# Patient Record
Sex: MALE | Race: WHITE | NOT HISPANIC OR LATINO
[De-identification: names, ages, dates, MRNs, and addresses within clinical notes are randomized per-mention and may not be internally consistent; named-entity substitution may affect disease eponyms.]

---

## 2017-09-27 ENCOUNTER — TRANSCRIPTION ENCOUNTER (OUTPATIENT)
Age: 52
End: 2017-09-27

## 2017-09-27 ENCOUNTER — OUTPATIENT (OUTPATIENT)
Dept: OUTPATIENT SERVICES | Facility: HOSPITAL | Age: 52
LOS: 1 days | Discharge: HOME | End: 2017-09-27
Payer: COMMERCIAL

## 2017-09-27 PROCEDURE — 45378 DIAGNOSTIC COLONOSCOPY: CPT

## 2020-05-12 ENCOUNTER — EMERGENCY (EMERGENCY)
Facility: HOSPITAL | Age: 55
LOS: 1 days | Discharge: HOME | End: 2020-05-12
Attending: EMERGENCY MEDICINE
Payer: COMMERCIAL

## 2020-05-12 VITALS
TEMPERATURE: 98 F | DIASTOLIC BLOOD PRESSURE: 100 MMHG | HEART RATE: 76 BPM | SYSTOLIC BLOOD PRESSURE: 145 MMHG | OXYGEN SATURATION: 99 % | WEIGHT: 195.11 LBS | RESPIRATION RATE: 18 BRPM | HEIGHT: 70 IN

## 2020-05-12 PROCEDURE — 99285 EMERGENCY DEPT VISIT HI MDM: CPT

## 2020-05-13 VITALS
HEART RATE: 59 BPM | OXYGEN SATURATION: 100 % | SYSTOLIC BLOOD PRESSURE: 125 MMHG | DIASTOLIC BLOOD PRESSURE: 82 MMHG | RESPIRATION RATE: 18 BRPM | TEMPERATURE: 99 F

## 2020-05-13 LAB
ALBUMIN SERPL ELPH-MCNC: 3.9 G/DL — SIGNIFICANT CHANGE UP (ref 3.3–5)
ALP SERPL-CCNC: 62 U/L — SIGNIFICANT CHANGE UP (ref 40–120)
ALT FLD-CCNC: 86 U/L — HIGH (ref 10–45)
ANION GAP SERPL CALC-SCNC: 16 MMOL/L — SIGNIFICANT CHANGE UP (ref 5–17)
APPEARANCE UR: CLEAR — SIGNIFICANT CHANGE UP
APTT BLD: 25.9 SEC — LOW (ref 27.5–36.3)
AST SERPL-CCNC: 66 U/L — HIGH (ref 10–40)
BACTERIA # UR AUTO: NEGATIVE — SIGNIFICANT CHANGE UP
BASOPHILS # BLD AUTO: 0.02 K/UL — SIGNIFICANT CHANGE UP (ref 0–0.2)
BASOPHILS NFR BLD AUTO: 0.2 % — SIGNIFICANT CHANGE UP (ref 0–2)
BILIRUB SERPL-MCNC: 0.8 MG/DL — SIGNIFICANT CHANGE UP (ref 0.2–1.2)
BILIRUB UR-MCNC: NEGATIVE — SIGNIFICANT CHANGE UP
BUN SERPL-MCNC: 22 MG/DL — SIGNIFICANT CHANGE UP (ref 7–23)
CALCIUM SERPL-MCNC: 9.2 MG/DL — SIGNIFICANT CHANGE UP (ref 8.4–10.5)
CHLORIDE SERPL-SCNC: 102 MMOL/L — SIGNIFICANT CHANGE UP (ref 96–108)
CO2 SERPL-SCNC: 21 MMOL/L — LOW (ref 22–31)
COLOR SPEC: YELLOW — SIGNIFICANT CHANGE UP
CREAT SERPL-MCNC: 1.33 MG/DL — HIGH (ref 0.5–1.3)
DIFF PNL FLD: NEGATIVE — SIGNIFICANT CHANGE UP
EOSINOPHIL # BLD AUTO: 0.09 K/UL — SIGNIFICANT CHANGE UP (ref 0–0.5)
EOSINOPHIL NFR BLD AUTO: 0.9 % — SIGNIFICANT CHANGE UP (ref 0–6)
EPI CELLS # UR: 1 /HPF — SIGNIFICANT CHANGE UP
GAS PNL BLDV: SIGNIFICANT CHANGE UP
GLUCOSE SERPL-MCNC: 108 MG/DL — HIGH (ref 70–99)
GLUCOSE UR QL: NEGATIVE — SIGNIFICANT CHANGE UP
HCT VFR BLD CALC: 39.3 % — SIGNIFICANT CHANGE UP (ref 39–50)
HGB BLD-MCNC: 13.2 G/DL — SIGNIFICANT CHANGE UP (ref 13–17)
HYALINE CASTS # UR AUTO: 1 /LPF — SIGNIFICANT CHANGE UP (ref 0–2)
IMM GRANULOCYTES NFR BLD AUTO: 0.3 % — SIGNIFICANT CHANGE UP (ref 0–1.5)
INR BLD: 0.87 RATIO — LOW (ref 0.88–1.16)
KETONES UR-MCNC: NEGATIVE — SIGNIFICANT CHANGE UP
LEUKOCYTE ESTERASE UR-ACNC: NEGATIVE — SIGNIFICANT CHANGE UP
LIDOCAIN IGE QN: 31 U/L — SIGNIFICANT CHANGE UP (ref 7–60)
LYMPHOCYTES # BLD AUTO: 1.6 K/UL — SIGNIFICANT CHANGE UP (ref 1–3.3)
LYMPHOCYTES # BLD AUTO: 16.6 % — SIGNIFICANT CHANGE UP (ref 13–44)
MCHC RBC-ENTMCNC: 29.2 PG — SIGNIFICANT CHANGE UP (ref 27–34)
MCHC RBC-ENTMCNC: 33.6 GM/DL — SIGNIFICANT CHANGE UP (ref 32–36)
MCV RBC AUTO: 86.9 FL — SIGNIFICANT CHANGE UP (ref 80–100)
MONOCYTES # BLD AUTO: 0.67 K/UL — SIGNIFICANT CHANGE UP (ref 0–0.9)
MONOCYTES NFR BLD AUTO: 7 % — SIGNIFICANT CHANGE UP (ref 2–14)
NEUTROPHILS # BLD AUTO: 7.22 K/UL — SIGNIFICANT CHANGE UP (ref 1.8–7.4)
NEUTROPHILS NFR BLD AUTO: 75 % — SIGNIFICANT CHANGE UP (ref 43–77)
NITRITE UR-MCNC: NEGATIVE — SIGNIFICANT CHANGE UP
NRBC # BLD: 0 /100 WBCS — SIGNIFICANT CHANGE UP (ref 0–0)
PH UR: 6 — SIGNIFICANT CHANGE UP (ref 5–8)
PLATELET # BLD AUTO: 189 K/UL — SIGNIFICANT CHANGE UP (ref 150–400)
POTASSIUM SERPL-MCNC: 3.8 MMOL/L — SIGNIFICANT CHANGE UP (ref 3.5–5.3)
POTASSIUM SERPL-SCNC: 3.8 MMOL/L — SIGNIFICANT CHANGE UP (ref 3.5–5.3)
PROT SERPL-MCNC: 7.1 G/DL — SIGNIFICANT CHANGE UP (ref 6–8.3)
PROT UR-MCNC: ABNORMAL
PROTHROM AB SERPL-ACNC: 10 SEC — SIGNIFICANT CHANGE UP (ref 10–12.9)
RBC # BLD: 4.52 M/UL — SIGNIFICANT CHANGE UP (ref 4.2–5.8)
RBC # FLD: 13.2 % — SIGNIFICANT CHANGE UP (ref 10.3–14.5)
RBC CASTS # UR COMP ASSIST: 1 /HPF — SIGNIFICANT CHANGE UP (ref 0–4)
SODIUM SERPL-SCNC: 139 MMOL/L — SIGNIFICANT CHANGE UP (ref 135–145)
SP GR SPEC: 1.02 — SIGNIFICANT CHANGE UP (ref 1.01–1.02)
UROBILINOGEN FLD QL: NEGATIVE — SIGNIFICANT CHANGE UP
WBC # BLD: 9.63 K/UL — SIGNIFICANT CHANGE UP (ref 3.8–10.5)
WBC # FLD AUTO: 9.63 K/UL — SIGNIFICANT CHANGE UP (ref 3.8–10.5)
WBC UR QL: 2 /HPF — SIGNIFICANT CHANGE UP (ref 0–5)

## 2020-05-13 PROCEDURE — 84132 ASSAY OF SERUM POTASSIUM: CPT

## 2020-05-13 PROCEDURE — 80053 COMPREHEN METABOLIC PANEL: CPT

## 2020-05-13 PROCEDURE — 85027 COMPLETE CBC AUTOMATED: CPT

## 2020-05-13 PROCEDURE — 85610 PROTHROMBIN TIME: CPT

## 2020-05-13 PROCEDURE — 81001 URINALYSIS AUTO W/SCOPE: CPT

## 2020-05-13 PROCEDURE — 74176 CT ABD & PELVIS W/O CONTRAST: CPT

## 2020-05-13 PROCEDURE — 93005 ELECTROCARDIOGRAM TRACING: CPT

## 2020-05-13 PROCEDURE — 85730 THROMBOPLASTIN TIME PARTIAL: CPT

## 2020-05-13 PROCEDURE — 76705 ECHO EXAM OF ABDOMEN: CPT | Mod: 26,RT

## 2020-05-13 PROCEDURE — 87086 URINE CULTURE/COLONY COUNT: CPT

## 2020-05-13 PROCEDURE — 99284 EMERGENCY DEPT VISIT MOD MDM: CPT | Mod: 25

## 2020-05-13 PROCEDURE — 82435 ASSAY OF BLOOD CHLORIDE: CPT

## 2020-05-13 PROCEDURE — 84295 ASSAY OF SERUM SODIUM: CPT

## 2020-05-13 PROCEDURE — 76705 ECHO EXAM OF ABDOMEN: CPT

## 2020-05-13 PROCEDURE — 82330 ASSAY OF CALCIUM: CPT

## 2020-05-13 PROCEDURE — 85014 HEMATOCRIT: CPT

## 2020-05-13 PROCEDURE — 82947 ASSAY GLUCOSE BLOOD QUANT: CPT

## 2020-05-13 PROCEDURE — 71046 X-RAY EXAM CHEST 2 VIEWS: CPT | Mod: 26

## 2020-05-13 PROCEDURE — 83690 ASSAY OF LIPASE: CPT

## 2020-05-13 PROCEDURE — 83605 ASSAY OF LACTIC ACID: CPT

## 2020-05-13 PROCEDURE — 71046 X-RAY EXAM CHEST 2 VIEWS: CPT

## 2020-05-13 PROCEDURE — 74176 CT ABD & PELVIS W/O CONTRAST: CPT | Mod: 26

## 2020-05-13 PROCEDURE — 82803 BLOOD GASES ANY COMBINATION: CPT

## 2020-05-13 NOTE — ED PROVIDER NOTE - PATIENT PORTAL LINK FT
You can access the FollowMyHealth Patient Portal offered by [facility] by registering at the following website: [email or web address]. By joining [employer]’s FollowMyHealth portal, you will also be able to view your health information using other applications (apps) compatible with our system.

## 2020-05-13 NOTE — ED ADULT NURSE NOTE - OBJECTIVE STATEMENT
55 y/o Male presenting to the ED ambulatory, A&Ox3, complaining of right sided abdominal pain radiating to the flank starting 5pm this afternoon worsening throughout the night. 55 y/o Male presenting to the ED ambulatory, A&Ox3, complaining of right sided flank pain that started 5pm this afternoon, after going for a walk pt reports the pain radiated to the upper right side of the abdomen. Pt reports 2/10 pain, declines pain medication. Pt hx of 2 MI, has a pacemaker/ defibrillator and 1 stent. Pt denies CP, SOB, cough, congestion, N/V/D, hx of kidney stones, headache, dizziness, diaphoresis, dysuria, hematuria, increased frequency of urination. Abdomen soft, nontender, nondistended. Pt appears well, in no current distress. EKG completed and given to MD Lopez. IV intact. Safety and comfort measures provided, bed locked and in lowest position, side rails up for safety. Call bell within reach. Awaiting blood work results, CT and X-ray.

## 2020-05-13 NOTE — ED PROVIDER NOTE - NSFOLLOWUPINSTRUCTIONS_ED_ALL_ED_FT
1. A copy of any of your resulted labs, imaging, and findings have been provided and discussed with you.   2. Follow up with your primary care doctor within 48 hours to assess the symptoms you were seen for in the emergency department and to review all results from your visit. If you don't have a doctor, call [phone] to make an appointment.  3. Schedule follow up with a surgeon and gastroenterologist   4. Return immediately to the emergency department for new, persistent, or worsening symptoms or signs.

## 2020-05-13 NOTE — ED PROVIDER NOTE - OBJECTIVE STATEMENT
54M with pmh CAD with stents, afib with PPM no AC, HTN presenting with intermittent right flank pain since this afternoon. States pain subsided in the evening but came back again 1 hour prior to arrival. Denies any trauma or inciting event. Denies fever, chills, cp, sob, nausea, vomiting, cough, dysuria, hematuria, constipation. No hx kidney stones. Hx of gallbladder sludge. No abdominal surgeries.

## 2020-05-13 NOTE — ED PROVIDER NOTE - NS ED ROS FT
GENERAL: No fever or chills  EYES: no change in vision  HEENT: no trouble swallowing or speaking  CARDIAC: no chest pain or palpitations  PULMONARY: no cough or SOB  GI: no abdominal pain, nausea, vomiting, diarrhea, or constipation   : No changes in urination  SKIN: no rashes  NEURO: no headache, numbness, or weakness  MSK: right flank pain

## 2020-05-13 NOTE — ED PROVIDER NOTE - ATTENDING CONTRIBUTION TO CARE
pt presents with RUQ/R flank pain since this pm. no n/v/d, urinary sx, sob, cp. on exam, pt with very mild RUQ tenderness, soft abd, unremarkable vitals. concern for kidney stone vs GB pathology vs hepatic or pancreatic pathology. low concern for cardiac given no chest symptoms and well localized to abd/flank. labs unremarkable, CT shows no kidney stone but does show some GB stones. awaiting RUQ u/s for further differentiation. dispo pending final u/s findings.

## 2020-05-13 NOTE — ED PROVIDER NOTE - CLINICAL SUMMARY MEDICAL DECISION MAKING FREE TEXT BOX
Patient presenting intermittent right flank pain. Abd benign non tender. No other constitutional symptoms. No infectious s/s. Possible renal colic, lower suspicion biliary colic. Will obtain stone hunt, screening labs.

## 2020-05-13 NOTE — ED PROVIDER NOTE - PHYSICAL EXAMINATION
GEN: NAD, awake, well appearing  HEENT: NCAT, MMM, normal conjunctiva, perrl  CHEST/LUNGS: Non-tachypneic, CTAB, bilateral breath sounds  CARDIAC: Non-tachycardic, s1s2, normal perfusion, no peripheral edema  ABDOMEN: Soft, NTND, No rebound/guarding  MSK: No CVA tenderness. No joint tenderness, no gross deformity of extremities  SKIN: No rashes, no petechiae, no vesicles  NEURO: CN grossly intact, normal coordination, no focal motor or sensory deficits  PSYCH: Alert, appropriate, cooperative

## 2020-05-13 NOTE — ED PROVIDER NOTE - NSFOLLOWUPCLINICS_GEN_ALL_ED_FT
[facility]  General Surgery  [address] [place] [address] - [address] [place] [address]  Phone: [phone]  Fax:   Follow Up Time: 1-3 Days

## 2020-05-13 NOTE — ED PROVIDER NOTE - PROGRESS NOTE DETAILS
Patient pain free, benign abd exam. Vitals wnl. US with sludge and stone. No obvious signs of acute obstruction at this time. Patient made aware of results and understanding. States will f/u with his PCP and GI doctor. Surgery referral given for additional f/u and possible elective surgery Patient pain free, benign abd exam. Vitals wnl. US with sludge and stone. No obvious signs of acute obstruction at this time. Likely transient biliary colic. Patient made aware of results and understanding. States will f/u with his PCP and GI doctor. Surgery referral given for additional f/u and possible elective surgery Patient pain free, benign abd exam. Vitals wnl. US with sludge and stone. No obvious signs of acute obstruction at this time. Likely transient biliary colic. Patient made aware of results and understanding. States will f/u with his PCP and GI doctor. Surgery referral given for additional f/u and possible elective surgery. Strict return precautions given.

## 2020-05-14 LAB
CULTURE RESULTS: SIGNIFICANT CHANGE UP
SPECIMEN SOURCE: SIGNIFICANT CHANGE UP

## 2020-05-22 ENCOUNTER — EMERGENCY (EMERGENCY)
Facility: HOSPITAL | Age: 55
LOS: 1 days | Discharge: HOME | End: 2020-05-22
Attending: EMERGENCY MEDICINE
Payer: COMMERCIAL

## 2020-05-22 VITALS
RESPIRATION RATE: 18 BRPM | HEART RATE: 69 BPM | TEMPERATURE: 99 F | DIASTOLIC BLOOD PRESSURE: 82 MMHG | WEIGHT: 195.11 LBS | HEIGHT: 70 IN | SYSTOLIC BLOOD PRESSURE: 147 MMHG | OXYGEN SATURATION: 98 %

## 2020-05-22 VITALS — OXYGEN SATURATION: 98 % | SYSTOLIC BLOOD PRESSURE: 112 MMHG | DIASTOLIC BLOOD PRESSURE: 72 MMHG | HEART RATE: 60 BPM

## 2020-05-22 PROBLEM — I48.91 UNSPECIFIED ATRIAL FIBRILLATION: Chronic | Status: ACTIVE | Noted: 2020-05-13

## 2020-05-22 PROBLEM — I25.10 ATHEROSCLEROTIC HEART DISEASE OF NATIVE CORONARY ARTERY WITHOUT ANGINA PECTORIS: Chronic | Status: ACTIVE | Noted: 2020-05-13

## 2020-05-22 LAB
ALBUMIN SERPL ELPH-MCNC: 3.5 G/DL — SIGNIFICANT CHANGE UP (ref 3.3–5)
ALP SERPL-CCNC: 65 U/L — SIGNIFICANT CHANGE UP (ref 40–120)
ALT FLD-CCNC: 103 U/L — HIGH (ref 10–45)
ANION GAP SERPL CALC-SCNC: 11 MMOL/L — SIGNIFICANT CHANGE UP (ref 5–17)
APTT BLD: 25.6 SEC — LOW (ref 27.5–36.3)
AST SERPL-CCNC: 68 U/L — HIGH (ref 10–40)
BASOPHILS # BLD AUTO: 0.01 K/UL — SIGNIFICANT CHANGE UP (ref 0–0.2)
BASOPHILS NFR BLD AUTO: 0.1 % — SIGNIFICANT CHANGE UP (ref 0–2)
BILIRUB SERPL-MCNC: 0.8 MG/DL — SIGNIFICANT CHANGE UP (ref 0.2–1.2)
BUN SERPL-MCNC: 18 MG/DL — SIGNIFICANT CHANGE UP (ref 7–23)
CALCIUM SERPL-MCNC: 8.8 MG/DL — SIGNIFICANT CHANGE UP (ref 8.4–10.5)
CHLORIDE SERPL-SCNC: 100 MMOL/L — SIGNIFICANT CHANGE UP (ref 96–108)
CO2 SERPL-SCNC: 23 MMOL/L — SIGNIFICANT CHANGE UP (ref 22–31)
CREAT SERPL-MCNC: 1.18 MG/DL — SIGNIFICANT CHANGE UP (ref 0.5–1.3)
EOSINOPHIL # BLD AUTO: 0.03 K/UL — SIGNIFICANT CHANGE UP (ref 0–0.5)
EOSINOPHIL NFR BLD AUTO: 0.4 % — SIGNIFICANT CHANGE UP (ref 0–6)
GAS PNL BLDV: SIGNIFICANT CHANGE UP
GLUCOSE SERPL-MCNC: 144 MG/DL — HIGH (ref 70–99)
HCT VFR BLD CALC: 36.5 % — LOW (ref 39–50)
HGB BLD-MCNC: 12 G/DL — LOW (ref 13–17)
IMM GRANULOCYTES NFR BLD AUTO: 0.6 % — SIGNIFICANT CHANGE UP (ref 0–1.5)
INR BLD: 0.99 RATIO — SIGNIFICANT CHANGE UP (ref 0.88–1.16)
LYMPHOCYTES # BLD AUTO: 0.78 K/UL — LOW (ref 1–3.3)
LYMPHOCYTES # BLD AUTO: 9.6 % — LOW (ref 13–44)
MCHC RBC-ENTMCNC: 28.8 PG — SIGNIFICANT CHANGE UP (ref 27–34)
MCHC RBC-ENTMCNC: 32.9 GM/DL — SIGNIFICANT CHANGE UP (ref 32–36)
MCV RBC AUTO: 87.7 FL — SIGNIFICANT CHANGE UP (ref 80–100)
MONOCYTES # BLD AUTO: 0.49 K/UL — SIGNIFICANT CHANGE UP (ref 0–0.9)
MONOCYTES NFR BLD AUTO: 6 % — SIGNIFICANT CHANGE UP (ref 2–14)
NEUTROPHILS # BLD AUTO: 6.8 K/UL — SIGNIFICANT CHANGE UP (ref 1.8–7.4)
NEUTROPHILS NFR BLD AUTO: 83.3 % — HIGH (ref 43–77)
NRBC # BLD: 0 /100 WBCS — SIGNIFICANT CHANGE UP (ref 0–0)
NT-PROBNP SERPL-SCNC: 1160 PG/ML — HIGH (ref 0–300)
PLATELET # BLD AUTO: 208 K/UL — SIGNIFICANT CHANGE UP (ref 150–400)
POTASSIUM SERPL-MCNC: 3.7 MMOL/L — SIGNIFICANT CHANGE UP (ref 3.5–5.3)
POTASSIUM SERPL-SCNC: 3.7 MMOL/L — SIGNIFICANT CHANGE UP (ref 3.5–5.3)
PROT SERPL-MCNC: 7.1 G/DL — SIGNIFICANT CHANGE UP (ref 6–8.3)
PROTHROM AB SERPL-ACNC: 11.4 SEC — SIGNIFICANT CHANGE UP (ref 10–12.9)
RBC # BLD: 4.16 M/UL — LOW (ref 4.2–5.8)
RBC # FLD: 13.2 % — SIGNIFICANT CHANGE UP (ref 10.3–14.5)
SARS-COV-2 RNA SPEC QL NAA+PROBE: SIGNIFICANT CHANGE UP
SODIUM SERPL-SCNC: 134 MMOL/L — LOW (ref 135–145)
TROPONIN T, HIGH SENSITIVITY RESULT: 15 NG/L — SIGNIFICANT CHANGE UP (ref 0–51)
TROPONIN T, HIGH SENSITIVITY RESULT: 19 NG/L — SIGNIFICANT CHANGE UP (ref 0–51)
WBC # BLD: 8.16 K/UL — SIGNIFICANT CHANGE UP (ref 3.8–10.5)
WBC # FLD AUTO: 8.16 K/UL — SIGNIFICANT CHANGE UP (ref 3.8–10.5)

## 2020-05-22 PROCEDURE — 83880 ASSAY OF NATRIURETIC PEPTIDE: CPT

## 2020-05-22 PROCEDURE — 82947 ASSAY GLUCOSE BLOOD QUANT: CPT

## 2020-05-22 PROCEDURE — 87635 SARS-COV-2 COVID-19 AMP PRB: CPT

## 2020-05-22 PROCEDURE — 85027 COMPLETE CBC AUTOMATED: CPT

## 2020-05-22 PROCEDURE — 71045 X-RAY EXAM CHEST 1 VIEW: CPT

## 2020-05-22 PROCEDURE — 85014 HEMATOCRIT: CPT

## 2020-05-22 PROCEDURE — 76705 ECHO EXAM OF ABDOMEN: CPT

## 2020-05-22 PROCEDURE — 76705 ECHO EXAM OF ABDOMEN: CPT | Mod: 26,RT

## 2020-05-22 PROCEDURE — 71045 X-RAY EXAM CHEST 1 VIEW: CPT | Mod: 26

## 2020-05-22 PROCEDURE — 99285 EMERGENCY DEPT VISIT HI MDM: CPT

## 2020-05-22 PROCEDURE — 99284 EMERGENCY DEPT VISIT MOD MDM: CPT | Mod: 25

## 2020-05-22 PROCEDURE — 83605 ASSAY OF LACTIC ACID: CPT

## 2020-05-22 PROCEDURE — 84132 ASSAY OF SERUM POTASSIUM: CPT

## 2020-05-22 PROCEDURE — 93005 ELECTROCARDIOGRAM TRACING: CPT

## 2020-05-22 PROCEDURE — 82803 BLOOD GASES ANY COMBINATION: CPT

## 2020-05-22 PROCEDURE — 85730 THROMBOPLASTIN TIME PARTIAL: CPT

## 2020-05-22 PROCEDURE — 93010 ELECTROCARDIOGRAM REPORT: CPT | Mod: 59

## 2020-05-22 PROCEDURE — 84295 ASSAY OF SERUM SODIUM: CPT

## 2020-05-22 PROCEDURE — 80053 COMPREHEN METABOLIC PANEL: CPT

## 2020-05-22 PROCEDURE — 84484 ASSAY OF TROPONIN QUANT: CPT

## 2020-05-22 PROCEDURE — 85610 PROTHROMBIN TIME: CPT

## 2020-05-22 PROCEDURE — 82435 ASSAY OF BLOOD CHLORIDE: CPT

## 2020-05-22 PROCEDURE — 82330 ASSAY OF CALCIUM: CPT

## 2020-05-22 RX ORDER — ACETAMINOPHEN 500 MG
650 TABLET ORAL ONCE
Refills: 0 | Status: COMPLETED | OUTPATIENT
Start: 2020-05-22 | End: 2020-05-22

## 2020-05-22 RX ADMIN — Medication 650 MILLIGRAM(S): at 10:32

## 2020-05-22 NOTE — ED PROVIDER NOTE - PHYSICAL EXAMINATION
General: NAD, good hygiene, well developed  HENT: Atraumatic, EOMI, no conjunctivae injection, moist mucosa, no post. oropharynx erythema, exudates  Neck: normal ROM and trachea midline   Cardiovascular: RRR, S1&2, no M or R, radial pulses equal and b/l  Respiratory: sat 99% O2 on RA, not tachypneic, no wheezes or crackles, decreased breath sounds on the right base  Abdominal: soft and non-tender non distended, neg for guarding, jackson's sign, rovsing's sign, mcburney's sign, no CVA tenderness   Extremities: no rashes, no edema of the legs/feet, DP/PT equal b/l  Skin: warm, well perfused  Neurologic: nonfocal, AAOx3  Psych: normal mood and affect

## 2020-05-22 NOTE — ED PROVIDER NOTE - NS ED ROS FT
GENERAL: No fever or chills, weight changes, nightsweats  EYES: no change in vision  HEENT: no dysplasia, odynophagia, ear pain, rhinorrhea, epistasis   CARDIAC: no palpitation   PULMONARY: HPI   GI: no abdominal pain, no nausea or no vomiting, no diarrhea or constipation  : No changes in urination for pain/freq.   SKIN: no rashes, abnormal bruising or bleeding  NEURO: no headache, numbness/tingling, extremity weakness   MSK: No joint pain

## 2020-05-22 NOTE — ED PROVIDER NOTE - NSFOLLOWUPINSTRUCTIONS_ED_ALL_ED_FT
Thank you for visiting our Emergency Department, it has been a pleasure taking part in your healthcare. Please follow up with your primary doctor within x48 hours.    Your discharge diagnosis is: pleural effusion of the right lung  Please continue your antibiotics and follow-up with your primary care doctor.     Return precautions to the Emergency Department include but are not limited to: unrelenting nausea, vomiting, fever, chills, chest pain, shortness of breath, dizziness, abdominal pain, worsening pain, syncope, blood in urine or stool, headache that doesn't resolve, numbness or tingling, loss of sensation, loss of motor function, or any other concerning symptoms.

## 2020-05-22 NOTE — ED PROVIDER NOTE - ATTENDING CONTRIBUTION TO CARE
Attending MD Jimenez:  I personally have seen and examined this patient.  Resident note reviewed and agree on plan of care and except where noted.

## 2020-05-22 NOTE — ED ADULT TRIAGE NOTE - ARRIVAL INFO ADDITIONAL COMMENTS
Covid tested 2 dyas ago at [facility] results pending

## 2020-05-22 NOTE — ED PROVIDER NOTE - PROGRESS NOTE DETAILS
Praneeth Pulido, PGY 2: patient clarified that he is taking doxycycline and cefdinir. Praneeth Pulido, PGY 2: vss, u/s showed pleural effusion and will d/c home w. continuation of abx.

## 2020-05-22 NOTE — ED PROVIDER NOTE - PATIENT PORTAL LINK FT
You can access the FollowMyHealth Patient Portal offered by [facility] by registering at the following website: [email or web address]. By joining [employer]’s FollowMyHealth portal, you will also be able to view your health information using other applications (apps) compatible with our system.

## 2020-05-22 NOTE — ED ADULT NURSE NOTE - OBJECTIVE STATEMENT
55 y/o male presents to the ed ambulatory through the waiting room c/o SOB, blood-tinged sputum, and right sided rib pain radiating to the lower back x4 days. PMH includes MI, CAD, status post stent, Afib (not currently on anticoagulants). Patient went to urgent care 2 days ago for the same symptoms, tested for covid pending results, started on ABX for presumed pna. On assessment, patients breathing is spontaneous, unlabored, in no acute distress. Pulse ox was 95% on RA. VS stable. No coughing during assessment. Abdomen soft, nondistended and nontender to palpation. Pt denies fever/chills, headache, n/v, diarrhea, dysuria, hematuria. Patient is resting comfortably, bed locked and in lowest position.

## 2020-05-22 NOTE — ED PROVIDER NOTE - OBJECTIVE STATEMENT
54M, h.o CAD, AFib (amio, no AC) s/p pacemaker, cholelithiasis, HTN, p.w shortness of breath and cough for 3 days along with right sided rib pain. 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 Pt states the symptom is not as severe as prior pna. Pt was eval at urgent 2 days ago and was told right pna and started on cefdinir and azithromycin, was tested for covid and waiting for result. recent dx of the cholelithiasis and follow-up a surgeon. No n/v, abdominal pain, diarrhea, or LE edema. Pt does take furosemide. No hx of cancer or blood clots.

## 2020-05-22 NOTE — ED PROVIDER NOTE - CLINICAL SUMMARY MEDICAL DECISION MAKING FREE TEXT BOX
54M, p.w fever and shortness of breath w. right side chest pain for 3 days and dx w. right pna recently. had small blood strain this am. normal vitals and normal pulse O2. no abd. pain, n/v, LE edema. concerning for pna and pleural effusion induced rib pain. due to prior cardiac hx, will get acs workup though unlikely. no sign of abdominal pain though recent cholelithiases. no cva tenderness. if neg workup w. d/c home w. continuing of medication. 54M, p.w fever and shortness of breath w. right side chest pain for 3 days and dx w. right pna recently. had small blood strain this am. normal vitals and normal pulse O2. no abd. pain, n/v, LE edema. concerning for pna and pleural effusion induced rib pain. due to prior cardiac hx, will get acs workup though unlikely. no sign of abdominal pain though recent cholelithiases. no cva tenderness. if neg workup w. d/c home w. continuing of medication.    SOB with coughing for past 3 days.  Dx with PNA and started on Cefdinir and azithromycin, COVID pending.  Today coughed up some blood and became concerned.  Had legionella PNA in past.  No LE edema, calf pain.  Exam: A & O x 3, NAD, HEENT WNL and no facial asymmetry; lungs decreased BS at right base, heart with reg rhythm without murmur; abdomen soft NTND; extremities with no edema; skin with no rashes, neuro exam non focal with no motor or sensory deficits. Plan: CXR, cardiac enzymes, pro-BNP, COVID swab.  EKG unchanged from prior. 54M, p.w fever and shortness of breath w. right side chest pain for 3 days and dx w. right pna recently. had small blood strain this am. normal vitals and normal pulse O2. no abd. pain, n/v, LE edema. concerning for pna and pleural effusion induced rib pain. due to prior cardiac hx, will get acs workup though unlikely. no sign of abdominal pain though recent cholelithiases. no cva tenderness. if neg workup w. d/c home w. continuing of medication.    SOB with coughing for past 3 days.  Dx with PNA and started on Cefdinir and  rx'd azithromycin but not taking, COVID pending.  Today coughed up some blood and became concerned.  Had legionella PNA in past.  No LE edema, calf pain.  Exam: A & O x 3, NAD, HEENT WNL and no facial asymmetry; lungs decreased BS at right base, heart with reg rhythm without murmur; abdomen soft NTND; extremities with no edema; skin with no rashes, neuro exam non focal with no motor or sensory deficits. Plan: CXR, cardiac enzymes, pro-BNP, COVID swab.  EKG unchanged from prior. 54M, p.w fever and shortness of breath w. right side chest pain for 3 days and dx w. right pna recently. had small blood strain this am. normal vitals and normal pulse O2. no abd. pain, n/v, LE edema. concerning for pna and pleural effusion induced rib pain. due to prior cardiac hx, will get acs workup though unlikely. no sign of abdominal pain though recent cholelithiases. no cva tenderness. if neg workup w. d/c home w. continuing of medication.    SOB with coughing for past 3 days.  Dx with PNA and started on Cefdinir and doxycycline but not taking, COVID pending.  Today coughed up some blood and became concerned.  Had legionella PNA in past.  No LE edema, calf pain.  Exam: A & O x 3, NAD, HEENT WNL and no facial asymmetry; lungs decreased BS at right base, heart with reg rhythm without murmur; abdomen soft NTND; extremities with no edema; skin with no rashes, neuro exam non focal with no motor or sensory deficits. Plan: CXR, cardiac enzymes, pro-BNP, COVID swab.  EKG unchanged from prior.

## 2021-09-15 ENCOUNTER — APPOINTMENT (OUTPATIENT)
Dept: COLORECTAL SURGERY | Facility: CLINIC | Age: 56
End: 2021-09-15
Payer: COMMERCIAL

## 2021-09-15 VITALS
HEIGHT: 70 IN | BODY MASS INDEX: 28.63 KG/M2 | WEIGHT: 200 LBS | SYSTOLIC BLOOD PRESSURE: 102 MMHG | DIASTOLIC BLOOD PRESSURE: 67 MMHG | HEART RATE: 73 BPM

## 2021-09-15 DIAGNOSIS — K64.9 UNSPECIFIED HEMORRHOIDS: ICD-10-CM

## 2021-09-15 PROCEDURE — 46500 INJECTION INTO HEMORRHOID(S): CPT

## 2021-09-15 RX ORDER — AMIODARONE HYDROCHLORIDE 200 MG/1
200 TABLET ORAL
Refills: 0 | Status: ACTIVE | COMMUNITY

## 2021-09-15 RX ORDER — CLOPIDOGREL BISULFATE 300 MG/1
TABLET, FILM COATED ORAL
Refills: 0 | Status: ACTIVE | COMMUNITY

## 2021-09-15 RX ORDER — DAPAGLIFLOZIN 10 MG/1
TABLET, FILM COATED ORAL
Refills: 0 | Status: ACTIVE | COMMUNITY

## 2021-09-15 RX ORDER — SIMVASTATIN 80 MG/1
TABLET, FILM COATED ORAL
Refills: 0 | Status: ACTIVE | COMMUNITY

## 2021-09-15 RX ORDER — OMEPRAZOLE, SODIUM BICARBONATE 40; 1100 MG/1; MG/1
CAPSULE ORAL
Refills: 0 | Status: ACTIVE | COMMUNITY

## 2021-09-15 RX ORDER — METOPROLOL SUCCINATE 200 MG/1
TABLET, EXTENDED RELEASE ORAL
Refills: 0 | Status: ACTIVE | COMMUNITY

## 2021-09-15 RX ORDER — SACUBITRIL AND VALSARTAN 49; 51 MG/1; MG/1
TABLET, FILM COATED ORAL
Refills: 0 | Status: ACTIVE | COMMUNITY

## 2021-09-15 RX ORDER — APIXABAN 5 MG/1
TABLET, FILM COATED ORAL
Refills: 0 | Status: ACTIVE | COMMUNITY

## 2021-09-15 RX ORDER — SPIRONOLACTONE 50 MG/1
TABLET ORAL
Refills: 0 | Status: ACTIVE | COMMUNITY

## 2021-09-15 RX ORDER — FUROSEMIDE 80 MG/1
TABLET ORAL
Refills: 0 | Status: ACTIVE | COMMUNITY

## 2021-09-16 PROBLEM — K64.9 HEMORRHOIDS: Status: ACTIVE | Noted: 2021-09-16

## 2021-09-16 RX ORDER — HYDROCORTISONE 25 MG/G
2.5 CREAM TOPICAL TWICE DAILY
Qty: 1 | Refills: 0 | Status: ACTIVE | COMMUNITY
Start: 2021-09-16 | End: 1900-01-01

## 2021-09-16 NOTE — REVIEW OF SYSTEMS
[Palpitations] : palpitations [Negative] : Genitourinary [Fever] : no fever [Chest Pain] : no chest pain [Constipation] : no constipation [Diarrhea] : no diarrhea

## 2021-09-16 NOTE — PHYSICAL EXAM
[de-identified] : No perianal erythema or induration.  Enlarged external hemorrhoid LLQ, RPQ. mild prolapse of internal hemorhroid LLQ RPQ

## 2021-09-16 NOTE — PROCEDURE
[FreeTextEntry1] : 1)  Anoscopy recommended as part of further workup.  Risks include bleeding and discomfort discussed.  Questions answered.  Pt provides verbal consent.\par \par Well lubricated anoscope inserted into anus.  All 4 quadrants examined.\par Anoscopy shows Grade 2-3 int hem RPQ, LLQ.  Small area of mucosal tear Posterior midline.  mildly inflammed.\par \par Pt tolerated procedure.\par \par 2) Sclerotherapy with injection of phenol/cottonseed oil recommended.  Risks include bleeding, pain, infection discussed.  Questions answered.  Pt provides verbal consent.\par Area adjacent to the rectal mucosa tear injected with 0.7mL 5%phenol in cottonseed oil.  This was approximately 1 cm proximal to the dentate line. There was blanching of mucosa after injection. Pt had moderate pain after procedure.  It eased somewhat after about 5 minutes of rest.  Attempted anoscopy, but patient had hypertonic sphincter.  NO signs of bleeding afterwards.  Patient did not wish to have anoscopy at this time.

## 2021-09-16 NOTE — HISTORY OF PRESENT ILLNESS
[FreeTextEntry1] : Here for evlauation of hemorrhoids.\par \par Pt reports after BM he would bleed and and have anal irritation.  Last year presented to Dr. [place] with hemorrhoids and was treated with injections.  The injections helped afterwards.  Had some blood clots. Pt does strain occasionally.   \par \par Did try stopping his blood thinners previously which led to PE's.  For his cholecystectomy, he had IVC filter placed that was removed a week or two after the cholecystectomy.\par \par Last colonoscopy in 2017 was normal other than hemorrhoids.  recall in 7-10 years.\par \par NKDA\par \par PMH\par HTN, cardiac arrhythmia, CAD s/p MI, CHF, hemorrhoids\par \par PSH\par s/p pacemaker/defibrillator, s/p cholecystectomy\par \par Meds\par metoprolol, omeprazole, simvastatin, spironolactone, plavix, furosemide, entresto, farxiga, eliquis\par \par

## 2021-09-16 NOTE — ASSESSMENT
[FreeTextEntry1] : Hemorrhoids\par --enlarged and prolapsing internal hemorrhoids seen and source of bleeding located to small mucosal tear in rectum posteriorly.\par --given issues with anticoagulation, recommended sclerotherapy with phenol/cottonseed oil.  Pt had moderate pain which eased somewhat after rest after procedure.\par --For now recommend warm baths for symptomatic relief.\par --if pain not easing or is worsening, or if patient has fevers or trouble urinating, he should go to ER for urgent re-evaluation for possible workup for infection and possible exam under anesthesia.  Pt expressed understanding\par --follow up in 2 weeks for anoscopy to evaluate area of sclerotherapy.

## 2021-09-27 ENCOUNTER — APPOINTMENT (OUTPATIENT)
Age: 56
End: 2021-09-27
Payer: MEDICAID

## 2021-09-27 VITALS
HEART RATE: 68 BPM | OXYGEN SATURATION: 100 % | SYSTOLIC BLOOD PRESSURE: 102 MMHG | BODY MASS INDEX: 28.63 KG/M2 | TEMPERATURE: 97 F | WEIGHT: 200 LBS | RESPIRATION RATE: 16 BRPM | DIASTOLIC BLOOD PRESSURE: 67 MMHG | HEIGHT: 70 IN

## 2021-09-27 PROCEDURE — 46600 DIAGNOSTIC ANOSCOPY SPX: CPT

## 2021-09-27 NOTE — HISTORY OF PRESENT ILLNESS
[FreeTextEntry1] : Bleeding has improved.  Currently bleeding with each BM, small amount in the bowl and wipe type.  With each day the amount continues to decrease.  On miralax.  Not constipated now.  Pain has improved.

## 2021-09-27 NOTE — ASSESSMENT
[FreeTextEntry1] : Hemorrhoids\par --s/p sclerotherapy with phenol/cottonseed oil.  Had some pain issues immediately after procedure, but this has improved.  Also bleeding has improved sigificantly.\par --anoscopy today only shows mild inflammation at the affected hemorrhoids, no evidence of contact bleeding with q-tip.\par --continue miralax given that this has resulted in improvement of bowel function.  Can reduce and titrate down as tolerated. \par --follow up in 2 weeks for repeat anoscopy to re-evaluate hemorrhoids.  may consider repeat sclerotherapy vs consideration for hemorrhoid surgery (THD  or otherwise).  Given improvement in bleeding with sclerotherapy, I would lean towards non-surgical means of treatment given patient cardiac comorbidities.

## 2021-09-27 NOTE — PHYSICAL EXAM
[de-identified] : No perianal erythema or induration.enlarged external hemorrhoids, not inflammed, not thrombosed.  NO prolapse of internal hemorrhoids noted.  MARTINA shows normal tone, no mass, no gross blood.

## 2021-09-27 NOTE — PROCEDURE
[FreeTextEntry1] : Anoscopy recommended as part of further workup.  Risks include bleeding and discomfort discussed.  Questions answered.  Pt provides verbal consent.\par \par Well lubricated anoscope inserted into anus.  All 4 quadrants examined.\par  Anosocpy shows grade 2 int hem RPQ, LLQ, minimally inflammed, no fissure\par \par Pt tolerated procedure.\par

## 2021-10-20 ENCOUNTER — APPOINTMENT (OUTPATIENT)
Dept: COLORECTAL SURGERY | Facility: CLINIC | Age: 56
End: 2021-10-20
Payer: MEDICAID

## 2021-10-20 VITALS
WEIGHT: 200 LBS | TEMPERATURE: 97.1 F | HEART RATE: 74 BPM | DIASTOLIC BLOOD PRESSURE: 67 MMHG | HEIGHT: 70 IN | SYSTOLIC BLOOD PRESSURE: 114 MMHG | BODY MASS INDEX: 28.63 KG/M2

## 2021-10-20 PROCEDURE — 46600 DIAGNOSTIC ANOSCOPY SPX: CPT

## 2021-10-22 RX ORDER — AMOXICILLIN 500 MG/1
500 CAPSULE ORAL
Qty: 21 | Refills: 0 | Status: DISCONTINUED | COMMUNITY
Start: 2021-10-06

## 2021-10-22 RX ORDER — FOLIC ACID 1 MG/1
1 TABLET ORAL
Qty: 30 | Refills: 0 | Status: DISCONTINUED | COMMUNITY
Start: 2021-02-26

## 2021-10-22 RX ORDER — OMEPRAZOLE 40 MG/1
40 CAPSULE, DELAYED RELEASE ORAL
Qty: 90 | Refills: 0 | Status: DISCONTINUED | COMMUNITY
Start: 2021-09-02

## 2021-10-22 NOTE — PROCEDURE
[FreeTextEntry1] : Anoscopy recommended as part of further workup.  Risks include bleeding and discomfort discussed.  Questions answered.  Pt provides verbal consent.\par \par Well lubricated anoscope inserted into anus.  All 4 quadrants examined.\par  Anosocpy shows grade 2 int hem RPQ, LLQ, not inflammed, no fissure\par \par Pt tolerated procedure.\par

## 2021-10-22 NOTE — HISTORY OF PRESENT ILLNESS
[FreeTextEntry1] : Has had no bleeding since last visit.  No rectal pain. \par \par BM's are normal.  On miralax daily.

## 2021-10-22 NOTE — PHYSICAL EXAM
[de-identified] : No perianal erythema or induration.enlarged external hemorrhoids, not inflammed, not thrombosed.  NO prolapse of internal hemorrhoids noted.  MARTINA shows normal tone, no mass, no gross blood.

## 2021-10-22 NOTE — ASSESSMENT
[FreeTextEntry1] : Hemorrhoids\par --s/p sclerotherapy with phenol/cottonseed oil. No further bleeding\par --anoscopy shows no inflammed internal hemorrhoids.  I suspect that the miralax and improvement of bowel function has resulted in less inflammation of the hemorrhoids.\par --given no symptoms for past 2 weeks, would just monitor for recurrent bleeding going forward.\par --follow up as needed.  If recurrent bleeding may consider repeat sclerotherapy vs consideration for hemorrhoid surgery (THD  or otherwise).

## 2022-08-27 ENCOUNTER — NON-APPOINTMENT (OUTPATIENT)
Age: 57
End: 2022-08-27

## 2022-08-28 ENCOUNTER — TRANSCRIPTION ENCOUNTER (OUTPATIENT)
Age: 57
End: 2022-08-28

## 2022-08-29 ENCOUNTER — OUTPATIENT (OUTPATIENT)
Dept: OUTPATIENT SERVICES | Facility: HOSPITAL | Age: 57
LOS: 1 days | End: 2022-08-29

## 2022-08-29 ENCOUNTER — APPOINTMENT (OUTPATIENT)
Dept: DISASTER EMERGENCY | Facility: HOSPITAL | Age: 57
End: 2022-08-29

## 2022-08-29 VITALS
DIASTOLIC BLOOD PRESSURE: 69 MMHG | OXYGEN SATURATION: 98 % | RESPIRATION RATE: 19 BRPM | SYSTOLIC BLOOD PRESSURE: 104 MMHG | TEMPERATURE: 98 F | HEART RATE: 60 BPM

## 2022-08-29 VITALS
OXYGEN SATURATION: 96 % | HEIGHT: 70 IN | SYSTOLIC BLOOD PRESSURE: 117 MMHG | RESPIRATION RATE: 19 BRPM | DIASTOLIC BLOOD PRESSURE: 78 MMHG | HEART RATE: 62 BPM | WEIGHT: 199.96 LBS | TEMPERATURE: 98 F

## 2022-08-29 DIAGNOSIS — U07.1 COVID-19: ICD-10-CM

## 2022-08-29 RX ORDER — BEBTELOVIMAB 87.5 MG/ML
175 INJECTION, SOLUTION INTRAVENOUS ONCE
Refills: 0 | Status: COMPLETED | OUTPATIENT
Start: 2022-08-29 | End: 2022-08-29

## 2022-08-29 RX ADMIN — BEBTELOVIMAB 175 MILLIGRAM(S): 87.5 INJECTION, SOLUTION INTRAVENOUS at 08:09

## 2022-08-29 NOTE — MONOCLONAL ANTIBODY INFUSION - EXAM
CC: Monoclonal Antibody Infusion/COVID 19 Positive  57yMale    exam/findings:  T(C): 36.6 (08-29-22 @ 07:50), Max: 36.6 (08-29-22 @ 07:50)  HR: 62 (08-29-22 @ 07:50) (62 - 62)  BP: 117/78 (08-29-22 @ 07:50) (117/78 - 117/78)  RR: 19 (08-29-22 @ 07:50) (19 - 19)  SpO2: 96% (08-29-22 @ 07:50) (96% - 96%)      PE:   Appearance: NAD	  HEENT:   Normal oral mucosa,   Lymphatic: No lymphadenopathy  Cardiovascular: Normal S1 S2, No JVD, No murmurs, No edema  Respiratory: Lungs clear to auscultation	  Gastrointestinal:  Soft, Non-tender, + BS	  Skin: warm and dry  Neurologic: Non-focal  Extremities: Normal range of motion,

## 2022-08-29 NOTE — MONOCLONAL ANTIBODY INFUSION - ASSESSMENT AND PLAN
This is a 57 yrs old male with PMHx of CAD, stentx  x2, defibrillator in place, HLD, and GERD and recently diagnosed with Covid-19 infection who [facility] testing positive for COVID 19 on 8/28/22.  Patient states he has been experiencing HA, cough, sore throat, malaise, and nasal congestion since 8/26/22. He denies any CP, SOB, chills, numbness/tingling in b/l limbs, loss of sensation or motor function, N/V/D. Pt is vaccinated and boosted with pfizer x 3.    PLAN:  - Injection procedure explained to patient   - Consent for monoclonal antibody injection obtained   - Risk & benefits discussed/all questions answered  - Inject Bebtelovimab 175 mg over 1 minute.  - Observe patient for one hour post administration    I have reviewed the Bebtelovimab Emergency Use Authorization (EUA) and I have provided the patient or patient's caregiver with the following information:    1. FDA has authorized emergency use Bebtelovimab, which is not an FDA-approved biological product.  2. The patient or patient's caregiver has the option to accept or refuse administration of Bebtelovimab.  3. The significant known and potential risks and benefits of Bebtelovimab and the extent to which such risks and benefits are unknown.  4. Information on available alternative treatments and risks and benefits of those alternatives.    The patient's COVID monoclonal antibody injection administration went well without any complications. The patient tolerated the treatment without any reactions. Vitals were stable throughout the injection & post-injection administration. The pt denies any CP, fevers, chills, SOB, numbness/tingling in b/l limbs, loss of sensation or motor function, N/V/D while receiving the injection. Patient denies any symptoms an hour after post injection. Vitals were taken post injection and were stable. Pt is medically stable to be discharged home. Discharge instructions were provided to the patient with a fact sheet included. Patient was instructed to self-isolate and use infection control measures (e.g wear mask, isolate, social distance, avoid sharing personal items, clean and disinfect "high touch" surfaces, and frequent handwashing according to the CDC guidelines. The patient was informed on what symptoms to be aware of for the next couple of days, and if there are any issues to call the 24/7 clinical call center. Patient was instructed to follow up with PCP as needed.   This is a 57 yrs old male with PMHx of CAD, stent x 2, defibrillator in place, HLD, and GERD and recently diagnosed with Covid-19 infection who was referred for monoclonal antibody infusion by provider in urgent care after testing positive for COVID 19 on 8/28/22.  Patient states he has been experiencing HA, cough, sore throat, malaise, and nasal congestion since 8/26/22. He denies any CP, SOB, chills, numbness/tingling in b/l limbs, loss of sensation or motor function, N/V/D. Pt is vaccinated and boosted with pfizer x 3.    PLAN:  - Injection procedure explained to patient   - Consent for monoclonal antibody injection obtained   - Risk & benefits discussed/all questions answered  - Inject Bebtelovimab 175 mg over 1 minute.  - Observe patient for one hour post administration    I have reviewed the Bebtelovimab Emergency Use Authorization (EUA) and I have provided the patient or patient's caregiver with the following information:    1. FDA has authorized emergency use Bebtelovimab, which is not an FDA-approved biological product.  2. The patient or patient's caregiver has the option to accept or refuse administration of Bebtelovimab.  3. The significant known and potential risks and benefits of Bebtelovimab and the extent to which such risks and benefits are unknown.  4. Information on available alternative treatments and risks and benefits of those alternatives.    The patient's COVID monoclonal antibody injection administration went well without any complications. The patient tolerated the treatment without any reactions. Vitals were stable throughout the injection & post-injection administration. The pt denies any CP, fevers, chills, SOB, numbness/tingling in b/l limbs, loss of sensation or motor function, N/V/D while receiving the injection. Patient denies any symptoms an hour after post injection. Vitals were taken post injection and were stable. Pt is medically stable to be discharged home. Discharge instructions were provided to the patient with a fact sheet included. Patient was instructed to self-isolate and use infection control measures (e.g wear mask, isolate, social distance, avoid sharing personal items, clean and disinfect "high touch" surfaces, and frequent handwashing according to the CDC guidelines. The patient was informed on what symptoms to be aware of for the next couple of days, and if there are any issues to call the 24/7 clinical call center. Patient was instructed to follow up with PCP as needed.

## 2023-01-25 ENCOUNTER — APPOINTMENT (OUTPATIENT)
Dept: SURGERY | Facility: CLINIC | Age: 58
End: 2023-01-25

## 2023-02-01 ENCOUNTER — APPOINTMENT (OUTPATIENT)
Dept: COLORECTAL SURGERY | Facility: CLINIC | Age: 58
End: 2023-02-01
Payer: MEDICARE

## 2023-02-01 VITALS
WEIGHT: 195 LBS | RESPIRATION RATE: 15 BRPM | DIASTOLIC BLOOD PRESSURE: 87 MMHG | HEART RATE: 70 BPM | SYSTOLIC BLOOD PRESSURE: 128 MMHG | OXYGEN SATURATION: 98 % | HEIGHT: 70 IN | BODY MASS INDEX: 27.92 KG/M2

## 2023-02-01 DIAGNOSIS — U07.1 COVID-19: ICD-10-CM

## 2023-02-01 DIAGNOSIS — Z86.718 PERSONAL HISTORY OF OTHER VENOUS THROMBOSIS AND EMBOLISM: ICD-10-CM

## 2023-02-01 DIAGNOSIS — Z80.8 FAMILY HISTORY OF MALIGNANT NEOPLASM OF OTHER ORGANS OR SYSTEMS: ICD-10-CM

## 2023-02-01 DIAGNOSIS — Z86.79 PERSONAL HISTORY OF OTHER DISEASES OF THE CIRCULATORY SYSTEM: ICD-10-CM

## 2023-02-01 DIAGNOSIS — Z86.711 PERSONAL HISTORY OF PULMONARY EMBOLISM: ICD-10-CM

## 2023-02-01 DIAGNOSIS — Z86.19 PERSONAL HISTORY OF OTHER INFECTIOUS AND PARASITIC DISEASES: ICD-10-CM

## 2023-02-01 DIAGNOSIS — K64.9 UNSPECIFIED HEMORRHOIDS: ICD-10-CM

## 2023-02-01 DIAGNOSIS — Z86.39 PERSONAL HISTORY OF OTHER ENDOCRINE, NUTRITIONAL AND METABOLIC DISEASE: ICD-10-CM

## 2023-02-01 DIAGNOSIS — Z78.9 OTHER SPECIFIED HEALTH STATUS: ICD-10-CM

## 2023-02-01 DIAGNOSIS — I25.2 OLD MYOCARDIAL INFARCTION: ICD-10-CM

## 2023-02-01 PROCEDURE — 99213 OFFICE O/P EST LOW 20 MIN: CPT | Mod: 25

## 2023-02-01 PROCEDURE — 46500 INJECTION INTO HEMORRHOID(S): CPT

## 2023-02-01 RX ORDER — EPLERENONE 25 MG/1
25 TABLET, COATED ORAL
Refills: 0 | Status: ACTIVE | COMMUNITY

## 2023-02-01 RX ORDER — VERICIGUAT 10 MG/1
10 TABLET, FILM COATED ORAL
Refills: 0 | Status: ACTIVE | COMMUNITY

## 2023-02-01 NOTE — REVIEW OF SYSTEMS
[Wheezing] : wheezing [Negative] : Endocrine [FreeTextEntry3] : Wears glasses [FreeTextEntry5] : CHF, stents x 2, PPM, Hx MI [FreeTextEntry6] : Hx PE [FreeTextEntry7] : Daily BM [de-identified] : Hx DVT

## 2023-02-01 NOTE — HISTORY OF PRESENT ILLNESS
[FreeTextEntry1] : Patient is a 56 yo male here with complaints of bleeding hemorrhoids.  He has been treated by Dr. Bolton in the past but due to insurance had to change.  He is on a couple of anticoagulation medications due to congestive heart failure.  He has been injected in the past which did help but last one was in July.  He has become anemic in the past from this as well.  States is taking a fiber supplement and avoids straining.

## 2023-02-01 NOTE — PHYSICAL EXAM
[Normal Breath Sounds] : Normal breath sounds [No Rash or Lesion] : No rash or lesion [Alert] : alert [Oriented to Person] : oriented to person [Oriented to Place] : oriented to place [Oriented to Time] : oriented to time [Calm] : calm [de-identified] : round, NT/ND, +BS [de-identified] : Normal male [de-identified] : NC/AT [de-identified] : paced [de-identified] : KEIRA/+ROM [de-identified] : Intact